# Patient Record
Sex: FEMALE | Race: WHITE | NOT HISPANIC OR LATINO | URBAN - METROPOLITAN AREA
[De-identification: names, ages, dates, MRNs, and addresses within clinical notes are randomized per-mention and may not be internally consistent; named-entity substitution may affect disease eponyms.]

---

## 2022-06-25 ENCOUNTER — EMERGENCY (EMERGENCY)
Facility: HOSPITAL | Age: 22
LOS: 1 days | Discharge: HOME | End: 2022-06-25
Admitting: EMERGENCY MEDICINE

## 2022-06-25 VITALS
DIASTOLIC BLOOD PRESSURE: 84 MMHG | HEIGHT: 61 IN | HEART RATE: 126 BPM | TEMPERATURE: 98 F | OXYGEN SATURATION: 97 % | WEIGHT: 125 LBS | RESPIRATION RATE: 16 BRPM | SYSTOLIC BLOOD PRESSURE: 116 MMHG

## 2022-06-25 VITALS — HEART RATE: 105 BPM

## 2022-06-25 DIAGNOSIS — R00.0 TACHYCARDIA, UNSPECIFIED: ICD-10-CM

## 2022-06-25 DIAGNOSIS — Z88.2 ALLERGY STATUS TO SULFONAMIDES: ICD-10-CM

## 2022-06-25 DIAGNOSIS — N39.0 URINARY TRACT INFECTION, SITE NOT SPECIFIED: ICD-10-CM

## 2022-06-25 DIAGNOSIS — R10.30 LOWER ABDOMINAL PAIN, UNSPECIFIED: ICD-10-CM

## 2022-06-25 DIAGNOSIS — U07.1 COVID-19: ICD-10-CM

## 2022-06-25 DIAGNOSIS — Z91.018 ALLERGY TO OTHER FOODS: ICD-10-CM

## 2022-06-25 LAB
ALBUMIN SERPL ELPH-MCNC: 4.2 G/DL — SIGNIFICANT CHANGE UP (ref 3.4–5)
ALP SERPL-CCNC: 45 U/L — SIGNIFICANT CHANGE UP (ref 40–120)
ALT FLD-CCNC: 24 U/L — SIGNIFICANT CHANGE UP (ref 12–42)
ANION GAP SERPL CALC-SCNC: 11 MMOL/L — SIGNIFICANT CHANGE UP (ref 9–16)
APPEARANCE UR: ABNORMAL
AST SERPL-CCNC: 22 U/L — SIGNIFICANT CHANGE UP (ref 15–37)
BACTERIA # UR AUTO: PRESENT /HPF
BASOPHILS # BLD AUTO: 0 K/UL — SIGNIFICANT CHANGE UP (ref 0–0.2)
BASOPHILS NFR BLD AUTO: 0 % — SIGNIFICANT CHANGE UP (ref 0–2)
BILIRUB SERPL-MCNC: 0.5 MG/DL — SIGNIFICANT CHANGE UP (ref 0.2–1.2)
BILIRUB UR-MCNC: ABNORMAL
BUN SERPL-MCNC: 11 MG/DL — SIGNIFICANT CHANGE UP (ref 7–23)
CALCIUM SERPL-MCNC: 9.2 MG/DL — SIGNIFICANT CHANGE UP (ref 8.5–10.5)
CHLORIDE SERPL-SCNC: 100 MMOL/L — SIGNIFICANT CHANGE UP (ref 96–108)
CO2 SERPL-SCNC: 25 MMOL/L — SIGNIFICANT CHANGE UP (ref 22–31)
COLOR SPEC: ABNORMAL
COMMENT - URINE: SIGNIFICANT CHANGE UP
CREAT SERPL-MCNC: 0.79 MG/DL — SIGNIFICANT CHANGE UP (ref 0.5–1.3)
DIFF PNL FLD: NEGATIVE — SIGNIFICANT CHANGE UP
EGFR: 108 ML/MIN/1.73M2 — SIGNIFICANT CHANGE UP
EOSINOPHIL # BLD AUTO: 0 K/UL — SIGNIFICANT CHANGE UP (ref 0–0.5)
EOSINOPHIL NFR BLD AUTO: 0 % — SIGNIFICANT CHANGE UP (ref 0–6)
EPI CELLS # UR: ABNORMAL /HPF (ref 0–5)
GIANT PLATELETS BLD QL SMEAR: PRESENT — SIGNIFICANT CHANGE UP
GLUCOSE SERPL-MCNC: 101 MG/DL — HIGH (ref 70–99)
GLUCOSE UR QL: NEGATIVE — SIGNIFICANT CHANGE UP
GRAN CASTS # UR COMP ASSIST: ABNORMAL /LPF
HCG UR QL: NEGATIVE — SIGNIFICANT CHANGE UP
HCT VFR BLD CALC: 43.6 % — SIGNIFICANT CHANGE UP (ref 34.5–45)
HGB BLD-MCNC: 15.4 G/DL — SIGNIFICANT CHANGE UP (ref 11.5–15.5)
KETONES UR-MCNC: ABNORMAL MG/DL
LACTATE SERPL-SCNC: 0.9 MMOL/L — SIGNIFICANT CHANGE UP (ref 0.4–2)
LEUKOCYTE ESTERASE UR-ACNC: NEGATIVE — SIGNIFICANT CHANGE UP
LG PLATELETS BLD QL AUTO: SLIGHT — SIGNIFICANT CHANGE UP
LYMPHOCYTES # BLD AUTO: 0.29 K/UL — LOW (ref 1–3.3)
LYMPHOCYTES # BLD AUTO: 3 % — LOW (ref 13–44)
MAGNESIUM SERPL-MCNC: 2.5 MG/DL — SIGNIFICANT CHANGE UP (ref 1.6–2.6)
MANUAL SMEAR VERIFICATION: SIGNIFICANT CHANGE UP
MCHC RBC-ENTMCNC: 30.9 PG — SIGNIFICANT CHANGE UP (ref 27–34)
MCHC RBC-ENTMCNC: 35.3 GM/DL — SIGNIFICANT CHANGE UP (ref 32–36)
MCV RBC AUTO: 87.4 FL — SIGNIFICANT CHANGE UP (ref 80–100)
MONOCYTES # BLD AUTO: 0.39 K/UL — SIGNIFICANT CHANGE UP (ref 0–0.9)
MONOCYTES NFR BLD AUTO: 4 % — SIGNIFICANT CHANGE UP (ref 2–14)
NEUTROPHILS # BLD AUTO: 8.82 K/UL — HIGH (ref 1.8–7.4)
NEUTROPHILS NFR BLD AUTO: 86 % — HIGH (ref 43–77)
NEUTS BAND # BLD: 5 % — SIGNIFICANT CHANGE UP (ref 0–8)
NITRITE UR-MCNC: POSITIVE
NRBC # BLD: 0 /100 — SIGNIFICANT CHANGE UP (ref 0–0)
NRBC # BLD: SIGNIFICANT CHANGE UP /100 WBCS (ref 0–0)
PH UR: 5.5 — SIGNIFICANT CHANGE UP (ref 5–8)
PLAT MORPH BLD: ABNORMAL
PLATELET # BLD AUTO: 214 K/UL — SIGNIFICANT CHANGE UP (ref 150–400)
PLATELET COUNT - ESTIMATE: ABNORMAL
POTASSIUM SERPL-MCNC: 4 MMOL/L — SIGNIFICANT CHANGE UP (ref 3.5–5.3)
POTASSIUM SERPL-SCNC: 4 MMOL/L — SIGNIFICANT CHANGE UP (ref 3.5–5.3)
PROT SERPL-MCNC: 7.6 G/DL — SIGNIFICANT CHANGE UP (ref 6.4–8.2)
PROT UR-MCNC: NEGATIVE MG/DL — SIGNIFICANT CHANGE UP
RBC # BLD: 4.99 M/UL — SIGNIFICANT CHANGE UP (ref 3.8–5.2)
RBC # FLD: 12.3 % — SIGNIFICANT CHANGE UP (ref 10.3–14.5)
RBC BLD AUTO: NORMAL — SIGNIFICANT CHANGE UP
RBC CASTS # UR COMP ASSIST: < 5 /HPF — SIGNIFICANT CHANGE UP
SODIUM SERPL-SCNC: 136 MMOL/L — SIGNIFICANT CHANGE UP (ref 132–145)
SP GR SPEC: >=1.03 — SIGNIFICANT CHANGE UP (ref 1–1.03)
TSH SERPL-MCNC: 1.03 UIU/ML — SIGNIFICANT CHANGE UP (ref 0.36–3.74)
UROBILINOGEN FLD QL: 1 E.U./DL — SIGNIFICANT CHANGE UP
VARIANT LYMPHS # BLD: 2 % — SIGNIFICANT CHANGE UP (ref 0–6)
WBC # BLD: 9.69 K/UL — SIGNIFICANT CHANGE UP (ref 3.8–10.5)
WBC # FLD AUTO: 9.69 K/UL — SIGNIFICANT CHANGE UP (ref 3.8–10.5)
WBC UR QL: < 5 /HPF — SIGNIFICANT CHANGE UP

## 2022-06-25 PROCEDURE — 93010 ELECTROCARDIOGRAM REPORT: CPT

## 2022-06-25 PROCEDURE — 99285 EMERGENCY DEPT VISIT HI MDM: CPT | Mod: 25

## 2022-06-25 RX ORDER — SODIUM CHLORIDE 9 MG/ML
1000 INJECTION INTRAMUSCULAR; INTRAVENOUS; SUBCUTANEOUS ONCE
Refills: 0 | Status: COMPLETED | OUTPATIENT
Start: 2022-06-25 | End: 2022-06-25

## 2022-06-25 RX ORDER — PHENAZOPYRIDINE HCL 100 MG
200 TABLET ORAL ONCE
Refills: 0 | Status: COMPLETED | OUTPATIENT
Start: 2022-06-25 | End: 2022-06-25

## 2022-06-25 RX ORDER — PHENAZOPYRIDINE HCL 100 MG
1 TABLET ORAL
Qty: 9 | Refills: 0
Start: 2022-06-25 | End: 2022-06-27

## 2022-06-25 RX ORDER — SIMETHICONE 80 MG/1
80 TABLET, CHEWABLE ORAL ONCE
Refills: 0 | Status: COMPLETED | OUTPATIENT
Start: 2022-06-25 | End: 2022-06-25

## 2022-06-25 RX ORDER — KETOROLAC TROMETHAMINE 30 MG/ML
15 SYRINGE (ML) INJECTION ONCE
Refills: 0 | Status: DISCONTINUED | OUTPATIENT
Start: 2022-06-25 | End: 2022-06-25

## 2022-06-25 RX ORDER — CEFUROXIME AXETIL 250 MG
1 TABLET ORAL
Qty: 10 | Refills: 0
Start: 2022-06-25 | End: 2022-06-29

## 2022-06-25 RX ORDER — CEFTRIAXONE 500 MG/1
1000 INJECTION, POWDER, FOR SOLUTION INTRAMUSCULAR; INTRAVENOUS ONCE
Refills: 0 | Status: COMPLETED | OUTPATIENT
Start: 2022-06-25 | End: 2022-06-25

## 2022-06-25 RX ADMIN — Medication 20 MILLIGRAM(S): at 01:02

## 2022-06-25 RX ADMIN — SODIUM CHLORIDE 1000 MILLILITER(S): 9 INJECTION INTRAMUSCULAR; INTRAVENOUS; SUBCUTANEOUS at 02:24

## 2022-06-25 RX ADMIN — SODIUM CHLORIDE 1000 MILLILITER(S): 9 INJECTION INTRAMUSCULAR; INTRAVENOUS; SUBCUTANEOUS at 00:57

## 2022-06-25 RX ADMIN — SIMETHICONE 80 MILLIGRAM(S): 80 TABLET, CHEWABLE ORAL at 00:57

## 2022-06-25 RX ADMIN — Medication 15 MILLIGRAM(S): at 00:57

## 2022-06-25 RX ADMIN — CEFTRIAXONE 100 MILLIGRAM(S): 500 INJECTION, POWDER, FOR SOLUTION INTRAMUSCULAR; INTRAVENOUS at 02:23

## 2022-06-25 RX ADMIN — Medication 200 MILLIGRAM(S): at 02:23

## 2022-06-25 NOTE — ED PROVIDER NOTE - CLINICAL SUMMARY MEDICAL DECISION MAKING FREE TEXT BOX
pt presents with c/o cramping lower abd pain, feeling like she needs to pass gas or BM, with nausea and vomiting. also tested COVID + today. tachy on arrival. will give fluids and get EKG along with labs. suspect gastroenteritis, abd soft, nontender. will give bentyl, toradol.

## 2022-06-25 NOTE — ED ADULT NURSE NOTE - OBJECTIVE STATEMENT
pt went to urgent care today as c/o reduced appetite, lower abdo pain,  "dark urine" , on ocp and uses condoms, period due next week, denies N, V, D, no spmhx, urgent care called pt as she arrived in triage to tell her she is covid +

## 2022-06-25 NOTE — ED PROVIDER NOTE - PATIENT PORTAL LINK FT
You can access the FollowMyHealth Patient Portal offered by [facility] by registering at the following website: [email or web address]. By joining [facility]’s FollowMyHealth portal, you will also be able to view your health information using other applications (apps) compatible with our system.

## 2022-06-25 NOTE — ED PROVIDER NOTE - OBJECTIVE STATEMENT
23yo F presents with c/o lower abd pain described as cramping starting at 8pm. states it feels like she needs to pass gas or have  BM but she has not been able to. also endorses nausea and vomiting x 4 episodes, nonbloody nonbilious. denies any similar prior episodes. denies any sick contacts but tested positive for COVID at  just PTA. no meds taken pta. noted to be tachycardic but denies any cp, sob, extremity swelling, dizziness, palpitations, fever, chills. on librium and OCP.

## 2022-06-25 NOTE — ED PROCEDURE NOTE - PROCEDURE ADDITIONAL DETAILS
Site appropriately prepped with iodine soultion. fx site identified via aspiration of blood (hemarthrosis) with 20ga needle under aseptic techniques and 10cc of plain 2% lidocaine injected into fx site via hematoma block.  No active bleeding s/p hematoma block and pt tolerated procedure well without complications.

## 2022-06-25 NOTE — ED ADULT TRIAGE NOTE - CHIEF COMPLAINT QUOTE
walk in to ED c/o intermittently severe lower abdominal pain with associated vomiting starting at approx 8pm today. denies fever, diarrhea, urinary sx, hx abdominal surgeries. LMP ~1 month ago. reports she is coming from urgent care where she had a +covid test result - reports sore throat since this AM.

## 2022-06-25 NOTE — ED PROVIDER NOTE - PROGRESS NOTE DETAILS
feeling improved. reports pain improved from 9/10 to 4/10. will give abx and pyridium for UTI and additional fluids. received s/o from [facility] paige pending IV abx and IVF. +U/A with urinary sx, abd soft, +incidental findings of COVID, AFVSS at time of d/c, pt non-toxic appearing, results, ddx, and f/u plans discussed with pt at bedside, d/c'd home to f/u with PMD and GYN, strict return precautions discussed, prompt return to ER for any worsening or new sx, pt verbalized understanding.

## 2022-06-26 LAB
CULTURE RESULTS: SIGNIFICANT CHANGE UP
SPECIMEN SOURCE: SIGNIFICANT CHANGE UP

## 2022-06-30 LAB
CULTURE RESULTS: SIGNIFICANT CHANGE UP
CULTURE RESULTS: SIGNIFICANT CHANGE UP
SPECIMEN SOURCE: SIGNIFICANT CHANGE UP
SPECIMEN SOURCE: SIGNIFICANT CHANGE UP